# Patient Record
Sex: FEMALE | Race: WHITE
[De-identification: names, ages, dates, MRNs, and addresses within clinical notes are randomized per-mention and may not be internally consistent; named-entity substitution may affect disease eponyms.]

---

## 2020-06-18 ENCOUNTER — HOSPITAL ENCOUNTER (EMERGENCY)
Dept: HOSPITAL 53 - M ED | Age: 45
LOS: 1 days | Discharge: HOME | End: 2020-06-19
Payer: MEDICAID

## 2020-06-18 VITALS — WEIGHT: 162.7 LBS | BODY MASS INDEX: 27.78 KG/M2 | HEIGHT: 64 IN

## 2020-06-18 VITALS — SYSTOLIC BLOOD PRESSURE: 177 MMHG | DIASTOLIC BLOOD PRESSURE: 107 MMHG
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
## 2020-06-18 DIAGNOSIS — R51: ICD-10-CM
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
## 2020-06-18 DIAGNOSIS — Z87.891: ICD-10-CM

## 2020-06-18 DIAGNOSIS — Z88.8: ICD-10-CM

## 2020-06-18 DIAGNOSIS — Z88.5: ICD-10-CM

## 2020-06-18 DIAGNOSIS — Z88.0: ICD-10-CM
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
## 2020-06-18 DIAGNOSIS — S80.211A: Primary | ICD-10-CM

## 2020-06-18 DIAGNOSIS — S80.212A: ICD-10-CM
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
